# Patient Record
Sex: FEMALE | Race: WHITE | NOT HISPANIC OR LATINO
[De-identification: names, ages, dates, MRNs, and addresses within clinical notes are randomized per-mention and may not be internally consistent; named-entity substitution may affect disease eponyms.]

---

## 2018-12-18 ENCOUNTER — RECORD ABSTRACTING (OUTPATIENT)
Age: 23
End: 2018-12-18

## 2018-12-18 DIAGNOSIS — Z86.39 PERSONAL HISTORY OF OTHER ENDOCRINE, NUTRITIONAL AND METABOLIC DISEASE: ICD-10-CM

## 2018-12-18 DIAGNOSIS — Z78.9 OTHER SPECIFIED HEALTH STATUS: ICD-10-CM

## 2018-12-18 DIAGNOSIS — Z83.49 FAMILY HISTORY OF OTHER ENDOCRINE, NUTRITIONAL AND METABOLIC DISEASES: ICD-10-CM

## 2018-12-18 RX ORDER — MONTELUKAST SODIUM 10 MG/1
10 TABLET, FILM COATED ORAL
Refills: 0 | Status: ACTIVE | COMMUNITY

## 2018-12-18 RX ORDER — CETIRIZINE HYDROCHLORIDE 10 MG/1
10 CAPSULE, LIQUID FILLED ORAL
Refills: 0 | Status: ACTIVE | COMMUNITY

## 2018-12-21 ENCOUNTER — APPOINTMENT (OUTPATIENT)
Dept: ENDOCRINOLOGY | Facility: CLINIC | Age: 23
End: 2018-12-21
Payer: MEDICAID

## 2018-12-21 VITALS
HEART RATE: 89 BPM | BODY MASS INDEX: 26.63 KG/M2 | HEIGHT: 64 IN | DIASTOLIC BLOOD PRESSURE: 70 MMHG | WEIGHT: 156 LBS | SYSTOLIC BLOOD PRESSURE: 112 MMHG

## 2018-12-21 PROCEDURE — 99214 OFFICE O/P EST MOD 30 MIN: CPT

## 2018-12-21 RX ORDER — NORGESTIMATE AND ETHINYL ESTRADIOL 0.25-0.035
0.25-35 KIT ORAL
Refills: 0 | Status: DISCONTINUED | COMMUNITY
End: 2018-12-21

## 2018-12-21 RX ORDER — LEVOTHYROXINE SODIUM 75 UG/1
75 TABLET ORAL DAILY
Refills: 0 | Status: DISCONTINUED | COMMUNITY
End: 2018-12-21

## 2019-02-20 ENCOUNTER — TRANSCRIPTION ENCOUNTER (OUTPATIENT)
Age: 24
End: 2019-02-20

## 2019-03-01 ENCOUNTER — OTHER (OUTPATIENT)
Age: 24
End: 2019-03-01

## 2019-03-05 ENCOUNTER — RECORD ABSTRACTING (OUTPATIENT)
Age: 24
End: 2019-03-05

## 2019-03-05 DIAGNOSIS — Z87.42 PERSONAL HISTORY OF OTHER DISEASES OF THE FEMALE GENITAL TRACT: ICD-10-CM

## 2019-03-07 ENCOUNTER — APPOINTMENT (OUTPATIENT)
Dept: ENDOCRINOLOGY | Facility: CLINIC | Age: 24
End: 2019-03-07
Payer: MEDICAID

## 2019-03-07 VITALS
WEIGHT: 151 LBS | HEART RATE: 91 BPM | BODY MASS INDEX: 25.78 KG/M2 | DIASTOLIC BLOOD PRESSURE: 70 MMHG | SYSTOLIC BLOOD PRESSURE: 110 MMHG | HEIGHT: 64 IN

## 2019-03-07 PROCEDURE — 99214 OFFICE O/P EST MOD 30 MIN: CPT

## 2019-03-07 RX ORDER — LEVOTHYROXINE SODIUM 88 UG/1
88 TABLET ORAL
Qty: 30 | Refills: 5 | Status: DISCONTINUED | COMMUNITY
Start: 2018-12-21 | End: 2019-03-07

## 2019-03-07 NOTE — REVIEW OF SYSTEMS
[Recent Weight Loss (___ Lbs)] : recent [unfilled] ~Ulb weight loss [Chest Pain] : no chest pain [Shortness Of Breath] : no shortness of breath [Irregular Menses] : regular menses [Tremors] : no tremors [Anxiety] : no anxiety [Insomnia] : no insomnia

## 2019-03-07 NOTE — HISTORY OF PRESENT ILLNESS
[FreeTextEntry1] : Here for a routine follow up visit of hypothyroidism, PCOS and vitamin D Deficiency.  \par Quality: primary hypothyroidism\par Severity:  mild\par Duration:  hypothyroidism diagnosed in 2017, PCOS diagnosed at age 13 by GYN\par Onset:  PCOS found in setting of oligoamenorrhea.\par Associated Symptoms:  Reports improved morning fatigue since dose change, but not back top baseline.  \par Modifying factors:  better with Levoxyl,  PCOS controlled with OCP alone.  \par \par Current Regimen:\par Levoxyl 88  mcg daily  (increased last visit)\par Has been slowly losing weight, but has to substantially reduce caloric intake and exercise a lot. \par

## 2019-03-07 NOTE — PHYSICAL EXAM
[No Acute Distress] : no acute distress [Well Nourished] : well nourished [Well Developed] : well developed [Normal Sclera/Conjunctiva] : normal sclera/conjunctiva [No Proptosis] : no proptosis [No Neck Mass] : no neck mass was observed [No LAD] : no lymphadenopathy [Thyroid Not Enlarged] : the thyroid was not enlarged [No Thyroid Nodules] : there were no palpable thyroid nodules [Normal Rate and Effort] : normal respiratory rhythm and effort [Clear to Auscultation] : lungs were clear to auscultation bilaterally [Normal Rate] : heart rate was normal  [Normal S1, S2] : normal S1 and S2 [Regular Rhythm] : with a regular rhythm [Murmurs] : no murmurs [Normal Gait] : normal gait [Normal Insight/Judgement] : insight and judgment were intact [Normal Affect] : the affect was normal [Normal Mood] : the mood was normal

## 2019-03-07 NOTE — ASSESSMENT
[FreeTextEntry1] : 23 year old female with primary hypothyroidism, PCOS and vitamin D deficiency.  Her TSH has improved but is not yet optimal for a female of reproductive age (<2.5).\par \par 1.  Hypothyroidism-  will increase Levoxyl to 100 mcg daily.\par 2.  Vitamin D def-  improved, continue Rx\par 3.  PCOS-  well controlled on OCP.

## 2019-03-12 ENCOUNTER — APPOINTMENT (OUTPATIENT)
Dept: ORTHOPEDIC SURGERY | Facility: CLINIC | Age: 24
End: 2019-03-12

## 2019-05-29 ENCOUNTER — RX RENEWAL (OUTPATIENT)
Age: 24
End: 2019-05-29

## 2019-06-20 ENCOUNTER — APPOINTMENT (OUTPATIENT)
Dept: ENDOCRINOLOGY | Facility: CLINIC | Age: 24
End: 2019-06-20
Payer: COMMERCIAL

## 2019-06-20 VITALS
HEART RATE: 74 BPM | HEIGHT: 64 IN | SYSTOLIC BLOOD PRESSURE: 120 MMHG | WEIGHT: 148 LBS | DIASTOLIC BLOOD PRESSURE: 80 MMHG | BODY MASS INDEX: 25.27 KG/M2

## 2019-06-20 DIAGNOSIS — Z00.00 ENCOUNTER FOR GENERAL ADULT MEDICAL EXAMINATION W/OUT ABNORMAL FINDINGS: ICD-10-CM

## 2019-06-20 DIAGNOSIS — M25.569 PAIN IN UNSPECIFIED KNEE: ICD-10-CM

## 2019-06-20 PROCEDURE — 99214 OFFICE O/P EST MOD 30 MIN: CPT

## 2019-06-20 RX ORDER — FOLIC ACID 1 MG/1
1 TABLET ORAL
Refills: 0 | Status: DISCONTINUED | COMMUNITY
End: 2019-06-20

## 2019-06-20 RX ORDER — LEVOTHYROXINE SODIUM 100 UG/1
100 TABLET ORAL
Qty: 90 | Refills: 1 | Status: DISCONTINUED | COMMUNITY
Start: 2019-03-07 | End: 2019-06-20

## 2019-06-20 NOTE — ASSESSMENT
[FreeTextEntry1] : 24 year old female with primary hypothyroidism, PCOS and vitamin D deficiency.  She is currently over-replaced on Levoxyl.  \par \par 1.  Hypothyroidism-  Reduce Levoxyl to 88 mcg daily.  Repeat TFTs in 3 months.\par 2.  Vitamin D def-  improved, continue Rx\par 3.  PCOS-  well controlled on OCP. \par \par Patient will call to schedule next appt (would recommend within 6 months) as she is currently interviewing for jobs and is not sure where she will be living.

## 2019-06-20 NOTE — HISTORY OF PRESENT ILLNESS
[FreeTextEntry1] : Here for a routine follow up visit of hypothyroidism, PCOS and vitamin D Deficiency.  \par Quality: primary hypothyroidism\par Severity:  mild\par Duration:  hypothyroidism diagnosed in 2017, PCOS diagnosed at age 13 by GYN\par Onset:  PCOS found in setting of oligoamenorrhea.\par Associated Symptoms:  Reports improved fatigue since last dose change.  \par Modifying factors:  better with Levoxyl,  PCOS controlled with OCP alone.  \par \par Current Regimen:\par Levoxyl 100  mcg daily  (increased last visit)\par \par Just graduated masters program and looking for work now. \par

## 2019-06-20 NOTE — REVIEW OF SYSTEMS
[Recent Weight Loss (___ Lbs)] : recent [unfilled] ~Ulb weight loss [Abdominal Pain] : abdominal pain [Fatigue] : no fatigue [Shortness Of Breath] : no shortness of breath [Hair Loss] : no hair loss

## 2019-06-20 NOTE — PHYSICAL EXAM
[No Acute Distress] : no acute distress [Well Nourished] : well nourished [Well Developed] : well developed [Normal Sclera/Conjunctiva] : normal sclera/conjunctiva [No Proptosis] : no proptosis [No Neck Mass] : no neck mass was observed [No LAD] : no lymphadenopathy [Thyroid Not Enlarged] : the thyroid was not enlarged [No Thyroid Nodules] : there were no palpable thyroid nodules [Normal Rate and Effort] : normal respiratory rhythm and effort [Clear to Auscultation] : lungs were clear to auscultation bilaterally [Normal Rate] : heart rate was normal  [Regular Rhythm] : with a regular rhythm [Murmurs] : no murmurs [Normal S1, S2] : normal S1 and S2 [Normal Gait] : normal gait [Normal Insight/Judgement] : insight and judgment were intact [Normal Affect] : the affect was normal [Normal Mood] : the mood was normal

## 2019-10-17 ENCOUNTER — TRANSCRIPTION ENCOUNTER (OUTPATIENT)
Age: 24
End: 2019-10-17

## 2019-11-11 ENCOUNTER — APPOINTMENT (OUTPATIENT)
Dept: ENDOCRINOLOGY | Facility: CLINIC | Age: 24
End: 2019-11-11

## 2020-02-03 ENCOUNTER — RX RENEWAL (OUTPATIENT)
Age: 25
End: 2020-02-03

## 2020-05-11 ENCOUNTER — RX RENEWAL (OUTPATIENT)
Age: 25
End: 2020-05-11

## 2020-05-28 ENCOUNTER — APPOINTMENT (OUTPATIENT)
Dept: ENDOCRINOLOGY | Facility: CLINIC | Age: 25
End: 2020-05-28
Payer: COMMERCIAL

## 2020-05-28 DIAGNOSIS — R14.0 ABDOMINAL DISTENSION (GASEOUS): ICD-10-CM

## 2020-05-28 PROCEDURE — 99214 OFFICE O/P EST MOD 30 MIN: CPT | Mod: 95

## 2020-05-28 RX ORDER — NORETHINDRONE ACETATE AND ETHINYL ESTRADIOL AND FERROUS FUMARATE 1.5-30(21)
KIT ORAL
Refills: 0 | Status: DISCONTINUED | COMMUNITY
End: 2020-05-28

## 2020-05-28 NOTE — PHYSICAL EXAM
[Healthy Appearance] : healthy appearance [No Acute Distress] : no acute distress [Normal Insight/Judgement] : insight and judgment were intact [Normal Mood] : the mood was normal [Normal Affect] : the affect was normal [de-identified] : No visible goiter

## 2020-05-28 NOTE — ASSESSMENT
[FreeTextEntry1] : 25 year old female with primary hypothyroidism, PCOS and vitamin D deficiency.   She is clinically and biochemically euthyroid on LT4 therapy.  \par \par 1.  Hypothyroidism-  Continue current dose of Levoxyl.  \par 2.  Vitamin D def-  improved, continue Rx\par 3.  PCOS-  well controlled with IUD alone. \par 4.  Constipation and bloating-  check celiac disease panel. \par \par

## 2020-05-28 NOTE — HISTORY OF PRESENT ILLNESS
[Medical Office: ([facility])___] : at the medical office located in  [Home] : at home, [unfilled] , at the time of the visit. [Verbal consent obtained from patient] : the patient, [unfilled] [FreeTextEntry1] : Telehealth Visit conducted due to COVID -19 Pandemic.\par Time started: 4:40PM\par Time Ended  5:01 PM\par \par Follow up of hypothyroidism, PCOS and vitamin D Deficiency.  \par Quality: primary hypothyroidism\par Severity:  mild\par Duration:  hypothyroidism diagnosed in 2017, PCOS diagnosed at age 13 by GYN\par Onset:  PCOS found in setting of oligoamenorrhea.\par Associated Symptoms:  Reports improved fatigue since last dose change.  \par Modifying factors:  better with Levoxyl\par \par Current Regimen:\par Levoxyl 88  mcg daily  \par Complains of frequent boats of bloating and constipation.  Saw GI a few years ago and had C-scope and polyp was found. \par Came off OCP and now has IUD.  Complains of slightly more acne now.  \par \par  \par

## 2020-07-15 ENCOUNTER — RX RENEWAL (OUTPATIENT)
Age: 25
End: 2020-07-15

## 2020-10-27 ENCOUNTER — RX RENEWAL (OUTPATIENT)
Age: 25
End: 2020-10-27

## 2020-12-03 ENCOUNTER — APPOINTMENT (OUTPATIENT)
Dept: ENDOCRINOLOGY | Facility: CLINIC | Age: 25
End: 2020-12-03
Payer: COMMERCIAL

## 2020-12-03 DIAGNOSIS — E28.2 POLYCYSTIC OVARIAN SYNDROME: ICD-10-CM

## 2020-12-03 DIAGNOSIS — E55.9 VITAMIN D DEFICIENCY, UNSPECIFIED: ICD-10-CM

## 2020-12-03 DIAGNOSIS — E03.9 HYPOTHYROIDISM, UNSPECIFIED: ICD-10-CM

## 2020-12-03 PROCEDURE — 99214 OFFICE O/P EST MOD 30 MIN: CPT | Mod: 95

## 2020-12-03 RX ORDER — ERGOCALCIFEROL 1.25 MG/1
1.25 MG CAPSULE ORAL
Qty: 12 | Refills: 1 | Status: DISCONTINUED | COMMUNITY
End: 2020-12-03

## 2020-12-03 NOTE — HISTORY OF PRESENT ILLNESS
[Home] : at home, [unfilled] , at the time of the visit. [Medical Office: ([facility])___] : at the medical office located in  [Verbal consent obtained from patient] : the patient, [unfilled] [FreeTextEntry1] : Telehealth Visit conducted due to COVID -19 Pandemic.\par Time started: 2:35 PM\par Time Ended   2:44 PM\par \par Follow up of hypothyroidism, PCOS and vitamin D Deficiency.  \par Quality: primary hypothyroidism\par Severity:  mild\par Duration:  hypothyroidism diagnosed in 2017, PCOS diagnosed at age 13 by GYN\par Onset:  PCOS found in setting of oligoamenorrhea.\par Associated Symptoms:   c/o fatigue and headache, arthralgias.   \par Modifying factors:  better with Levoxyl\par \par Current Regimen:\par Levoxyl 88  mcg daily  \par \par  Gained 30 pounds over 4 months and now unable to lose.   Currently weights 177 pounds (weight has been stuck at this level since March)\par Has IUD now so does not get many menses.  c/o increased acne.  \par

## 2020-12-03 NOTE — ASSESSMENT
[FreeTextEntry1] : 25 year old female with primary hypothyroidism, PCOS and vitamin D deficiency.    \par \par 1.  Hypothyroidism-  Check TFTs now and titrate LT4 as indicated based on results.  \par 2.  Vitamin D def- continue Rx. Check level now.  \par 3.  PCOS-  seems well controlled with IUD alone.  Check hormone levels now.   \par 4 .  Weight gain-  if unable to get to goal with further lifestyle changes, could consider short term use of pharmacologic therapy.  \par

## 2020-12-03 NOTE — REVIEW OF SYSTEMS
[Fatigue] : fatigue [Joint Pain] : joint pain [Hair Loss] : hair loss [Palpitations] : no palpitations [Constipation] : no constipation

## 2020-12-15 ENCOUNTER — RX RENEWAL (OUTPATIENT)
Age: 25
End: 2020-12-15

## 2021-02-03 ENCOUNTER — TRANSCRIPTION ENCOUNTER (OUTPATIENT)
Age: 26
End: 2021-02-03

## 2021-02-04 ENCOUNTER — TRANSCRIPTION ENCOUNTER (OUTPATIENT)
Age: 26
End: 2021-02-04

## 2021-02-05 ENCOUNTER — TRANSCRIPTION ENCOUNTER (OUTPATIENT)
Age: 26
End: 2021-02-05

## 2021-02-05 RX ORDER — LEVOTHYROXINE SODIUM 88 UG/1
88 TABLET ORAL
Qty: 14 | Refills: 0 | Status: DISCONTINUED | COMMUNITY
Start: 2019-06-20 | End: 2021-02-05

## 2021-05-12 RX ORDER — LEVOTHYROXINE SODIUM 50 UG/1
50 TABLET ORAL
Qty: 90 | Refills: 0 | Status: ACTIVE | COMMUNITY
Start: 2021-02-05 | End: 1900-01-01

## 2021-05-28 RX ORDER — LIOTHYRONINE SODIUM 5 UG/1
5 TABLET ORAL DAILY
Qty: 180 | Refills: 0 | Status: ACTIVE | COMMUNITY
Start: 2021-02-05 | End: 1900-01-01

## 2021-06-01 RX ORDER — ERGOCALCIFEROL 1.25 MG/1
1.25 MG CAPSULE, LIQUID FILLED ORAL
Qty: 4 | Refills: 5 | Status: ACTIVE | COMMUNITY
Start: 2020-07-15 | End: 1900-01-01

## 2021-06-04 ENCOUNTER — APPOINTMENT (OUTPATIENT)
Dept: ENDOCRINOLOGY | Facility: CLINIC | Age: 26
End: 2021-06-04